# Patient Record
Sex: MALE | URBAN - METROPOLITAN AREA
[De-identification: names, ages, dates, MRNs, and addresses within clinical notes are randomized per-mention and may not be internally consistent; named-entity substitution may affect disease eponyms.]

---

## 2021-04-22 ENCOUNTER — OFFICE VISIT (OUTPATIENT)
Dept: URBAN - METROPOLITAN AREA CLINIC 7 | Facility: CLINIC | Age: 51
End: 2021-04-22
Payer: COMMERCIAL

## 2021-04-22 DIAGNOSIS — H33.8 OTHER RETINAL DETACHMENTS: Primary | ICD-10-CM

## 2021-04-22 PROCEDURE — 99212 OFFICE O/P EST SF 10 MIN: CPT | Performed by: OPHTHALMOLOGY

## 2021-04-22 ASSESSMENT — INTRAOCULAR PRESSURE
OD: 8
OS: 17

## 2021-04-22 NOTE — IMPRESSION/PLAN
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 As [facility] is LP, do not recommend any additional Sx at this time. Monocular precautions reviewed.  RTC sooner prn dec VA, inc Sxs.

1 year

## 2022-06-15 ENCOUNTER — OFFICE VISIT (OUTPATIENT)
Dept: URBAN - METROPOLITAN AREA CLINIC 7 | Facility: CLINIC | Age: 52
End: 2022-06-15
Payer: MEDICARE

## 2022-06-15 DIAGNOSIS — H33.8 OTHER RETINAL DETACHMENTS: Primary | ICD-10-CM

## 2022-06-15 PROCEDURE — 92134 CPTRZ OPH DX IMG PST SGM RTA: CPT | Performed by: OPHTHALMOLOGY

## 2022-06-15 PROCEDURE — 92014 COMPRE OPH EXAM EST PT 1/>: CPT | Performed by: OPHTHALMOLOGY

## 2022-06-15 RX ORDER — PREDNISOLONE ACETATE 10 MG/ML
1 % SUSPENSION/ DROPS OPHTHALMIC
Qty: 5 | Refills: 5 | Status: ACTIVE
Start: 2022-06-15

## 2022-06-15 ASSESSMENT — INTRAOCULAR PRESSURE
OS: 14
OD: 8

## 2022-06-15 NOTE — IMPRESSION/PLAN
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 Eye is hypotonous with retina under oil. As [facility] is LP, do not recommend any additional Sx at this time. 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 Start PF OD QID. Monocular precautions reviewed.  RTC sooner prn dec VA, inc Sxs.

3 months; if stable then 6 months

## 2022-09-14 ENCOUNTER — OFFICE VISIT (OUTPATIENT)
Dept: URBAN - METROPOLITAN AREA CLINIC 7 | Facility: CLINIC | Age: 52
End: 2022-09-14
Payer: MEDICARE

## 2022-09-14 DIAGNOSIS — H33.8 OTHER RETINAL DETACHMENTS: Primary | ICD-10-CM

## 2022-09-14 DIAGNOSIS — H35.413 LATTICE DEGENERATION OF RETINA, BILATERAL: ICD-10-CM

## 2022-09-14 PROCEDURE — 92014 COMPRE OPH EXAM EST PT 1/>: CPT | Performed by: OPHTHALMOLOGY

## 2022-09-14 PROCEDURE — 92134 CPTRZ OPH DX IMG PST SGM RTA: CPT | Performed by: OPHTHALMOLOGY

## 2022-09-14 ASSESSMENT — INTRAOCULAR PRESSURE
OS: 14
OD: 14

## 2022-09-14 NOTE — IMPRESSION/PLAN
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 Eye is hypotonous with retina under oil. As [facility] is LP, do not recommend any additional Sx at this time. 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 Cont PF OD QID. Monocular precautions reviewed.  RTC sooner prn dec VA, inc Sxs.

6 months

## 2022-09-14 NOTE — IMPRESSION/PLAN
Impression: Lattice degeneration of retina OU Plan: Exam shows lattice degeneration without holes/detachment OS. RDW reviewed at length; RTC immediately prn dec VA, inc Sxs.

## 2023-10-02 ENCOUNTER — OFFICE VISIT (OUTPATIENT)
Dept: URBAN - METROPOLITAN AREA CLINIC 7 | Facility: CLINIC | Age: 53
End: 2023-10-02
Payer: MEDICARE

## 2023-10-02 DIAGNOSIS — H33.8 OTHER RETINAL DETACHMENTS: Primary | ICD-10-CM

## 2023-10-02 DIAGNOSIS — H35.413 LATTICE DEGENERATION OF RETINA, BILATERAL: ICD-10-CM

## 2023-10-02 PROCEDURE — 92134 CPTRZ OPH DX IMG PST SGM RTA: CPT | Performed by: OPHTHALMOLOGY

## 2023-10-02 PROCEDURE — 99212 OFFICE O/P EST SF 10 MIN: CPT | Performed by: OPHTHALMOLOGY

## 2023-10-02 RX ORDER — PREDNISOLONE ACETATE 10 MG/ML
1 % SUSPENSION/ DROPS OPHTHALMIC
Qty: 5 | Refills: 5 | Status: INACTIVE
Start: 2023-10-02 | End: 2023-12-30

## 2023-10-02 ASSESSMENT — INTRAOCULAR PRESSURE
OS: 14
OD: 18

## 2024-04-03 ENCOUNTER — OFFICE VISIT (OUTPATIENT)
Dept: URBAN - METROPOLITAN AREA CLINIC 7 | Facility: CLINIC | Age: 54
End: 2024-04-03
Payer: MEDICARE

## 2024-04-03 DIAGNOSIS — H33.8 OTHER RETINAL DETACHMENTS: Primary | ICD-10-CM

## 2024-04-03 DIAGNOSIS — H35.413 LATTICE DEGENERATION OF RETINA, BILATERAL: ICD-10-CM

## 2024-04-03 PROCEDURE — 92134 CPTRZ OPH DX IMG PST SGM RTA: CPT | Performed by: STUDENT IN AN ORGANIZED HEALTH CARE EDUCATION/TRAINING PROGRAM

## 2024-04-03 PROCEDURE — 99214 OFFICE O/P EST MOD 30 MIN: CPT | Performed by: STUDENT IN AN ORGANIZED HEALTH CARE EDUCATION/TRAINING PROGRAM

## 2024-04-03 ASSESSMENT — INTRAOCULAR PRESSURE
OD: 9
OS: 16

## 2024-10-10 ENCOUNTER — OFFICE VISIT (OUTPATIENT)
Dept: URBAN - METROPOLITAN AREA CLINIC 7 | Facility: CLINIC | Age: 54
End: 2024-10-10
Payer: MEDICARE

## 2024-10-10 DIAGNOSIS — H35.413 LATTICE DEGENERATION OF RETINA, BILATERAL: ICD-10-CM

## 2024-10-10 DIAGNOSIS — H33.8 OTHER RETINAL DETACHMENTS: Primary | ICD-10-CM

## 2024-10-10 PROCEDURE — 99214 OFFICE O/P EST MOD 30 MIN: CPT | Performed by: STUDENT IN AN ORGANIZED HEALTH CARE EDUCATION/TRAINING PROGRAM

## 2024-10-10 PROCEDURE — 92134 CPTRZ OPH DX IMG PST SGM RTA: CPT | Performed by: STUDENT IN AN ORGANIZED HEALTH CARE EDUCATION/TRAINING PROGRAM

## 2024-10-10 ASSESSMENT — INTRAOCULAR PRESSURE
OD: 14
OS: 20